# Patient Record
(demographics unavailable — no encounter records)

---

## 2025-03-03 NOTE — ASSESSMENT
[FreeTextEntry1] : Discussed at length with patient exam history and imaging and if no skin improvement after physical therapy home exercise medication she is to call for MRI evaluation of cervical spine

## 2025-03-03 NOTE — PHYSICAL EXAM
[de-identified] : Right Shoulder: Constitutional: The patient is healthy-appearing and in no apparent distress.   Cardiovascular System:  The capillary refill is less than 2 seconds.   Skin:  There are no skin abnormalities.  C-Spine/Neck:  Active Range of Motion: Flexion				50 Extension			60 Lateral rotation			80    Right Shoulder:  Inspection:  There is no atrophy, erythema, warmth, swelling. There is no scapular winging. There is no AC prominence.   Bony Palpation:  There is no tenderness of the clavicle. There is no tenderness of the acromioclavicular joint. There is no tenderness of the greater tuberosity.  There is no tenderness of the bicipital groove.   Soft Tissue Palpation:  There is tenderness of the trapezius. There is tenderness of the rhomboid. There is no tenderness of the subacromial bursa.   Active Range of Motion:  Forward flexion- 				180  Abduction-					150 External rotation at 0 degrees abduction-	80  Internal rotation at 0 degrees abduction-	80  Passive Range of Motion:  Forward flexion- 			180  Abduction-				150 External rotation at 0 deg abduction-	80  Internal rotation at 0 deg abduction-	80  Special Tests:  Hawkin's  				Negative  Neer's  				Negative Speed's  				Negative AC cross-over 			            Negative Gulf's  				Negative  Stability:  There is no general laxity.   Psychiatric:  The patient demonstrates a normal mood and affect and is active and alert  [de-identified] : Based on detailed discussion with history and physical examination of the patient, x-ray evaluation is recommended and ordered for treatment and diagnosis. X-ray cervical spine 2 view: There is no significant bony / soft tissue abnormality, arthritis, or fracture except loss of lordosis X-ray right shoulder 2 view: There is no significant bony / soft tissue abnormality, arthritis, or fracture.

## 2025-03-03 NOTE — HISTORY OF PRESENT ILLNESS
[de-identified] : Initial visit: right shoulder new issue  Reason no falls or injuries  xray order  Duration: 4 months  Symptoms: shooting pain  Pain level: 8/10 Aggravating Fx: movements Alleviating Fx: heat pad/ ice the area and rest